# Patient Record
Sex: MALE | Race: WHITE | NOT HISPANIC OR LATINO | Employment: UNEMPLOYED | URBAN - METROPOLITAN AREA
[De-identification: names, ages, dates, MRNs, and addresses within clinical notes are randomized per-mention and may not be internally consistent; named-entity substitution may affect disease eponyms.]

---
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
## 2024-03-12 ENCOUNTER — OFFICE VISIT (OUTPATIENT)
Dept: PEDIATRICS | Facility: CLINIC | Age: 19
End: 2024-03-12
Payer: COMMERCIAL

## 2024-03-12 VITALS
TEMPERATURE: 98 F | HEIGHT: 76 IN | HEART RATE: 74 BPM | DIASTOLIC BLOOD PRESSURE: 72 MMHG | WEIGHT: 198.8 LBS | RESPIRATION RATE: 18 BRPM | SYSTOLIC BLOOD PRESSURE: 112 MMHG | BODY MASS INDEX: 24.21 KG/M2

## 2024-03-12 DIAGNOSIS — J30.9 ALLERGIC RHINOCONJUNCTIVITIS: ICD-10-CM

## 2024-03-12 DIAGNOSIS — H10.10 ALLERGIC RHINOCONJUNCTIVITIS: ICD-10-CM

## 2024-03-12 DIAGNOSIS — R04.0 RECURRENT EPISTAXIS: Primary | ICD-10-CM

## 2024-03-12 PROCEDURE — 3008F BODY MASS INDEX DOCD: CPT | Performed by: PEDIATRICS

## 2024-03-12 PROCEDURE — 1036F TOBACCO NON-USER: CPT | Performed by: PEDIATRICS

## 2024-03-12 PROCEDURE — 99214 OFFICE O/P EST MOD 30 MIN: CPT | Performed by: PEDIATRICS

## 2024-03-12 RX ORDER — FLUTICASONE PROPIONATE 50 MCG
2 SPRAY, SUSPENSION (ML) NASAL DAILY
Qty: 16 G | Refills: 11 | Status: SHIPPED | OUTPATIENT
Start: 2024-03-12 | End: 2024-06-10 | Stop reason: SDUPTHER

## 2024-03-12 NOTE — PROGRESS NOTES
"  Patient ID: Enoch Mcclelland is a 18 y.o. male who presents for Epistaxis (Nose Bleed) (Persistent nose bleeds of and on for 1 week ).  Today he is accompanied by accompanied by his MOTHER.     Concerned about nose bleeds that occur 6-8 times per month over the past 2 months  Admits to itchy/watery eyes, sneezing, clear nasal discharge, throat clearing, morning cough, and nasal congestion.  Denies purulent nasal drainage, fevers, vomiting, diarrhea, rash, otalgia.  Denies gingival bleeds, easy bruising, hematuria, hematochezia, melena, prolonged menses, or heavy menses.      Current Outpatient Medications:     escitalopram (Lexapro) 20 mg tablet, Take 1 tablet (20 mg) by mouth once daily., Disp: 90 tablet, Rfl: 1    fluticasone (Flonase) 50 mcg/actuation nasal spray, Administer 2 sprays into each nostril once daily. Shake gently. Before first use, prime pump. After use, clean tip and replace cap., Disp: 16 g, Rfl: 11    Past Medical History:   Diagnosis Date    Other conditions influencing health status 2020    Birth of     Other conditions influencing health status 2020    No prior hospitalizations       Past Surgical History:   Procedure Laterality Date    OTHER SURGICAL HISTORY  2020    Surgery       No family history on file.    Social History     Tobacco Use    Smoking status: Never     Passive exposure: Never    Smokeless tobacco: Never   Vaping Use    Vaping Use: Never used       Objective   /72   Pulse 74   Temp 36.7 °C (98 °F)   Resp 18   Ht 1.93 m (6' 4\")   Wt 90.2 kg (198 lb 12.8 oz)   BMI 24.20 kg/m²   BSA: 2.2 meters squared        BMI: Body mass index is 24.2 kg/m².   Growth percentiles: Height:  99 %ile (Z= 2.30) based on CDC (Boys, 2-20 Years) Stature-for-age data based on Stature recorded on 3/12/2024.   Weight:  92 %ile (Z= 1.41) based on CDC (Boys, 2-20 Years) weight-for-age data using vitals from 3/12/2024.  BMI:  67 %ile (Z= 0.44) based on CDC (Boys, 2-20 " Years) BMI-for-age based on BMI available as of 3/12/2024.    PHYSICAL EXAM  General  General Appearance - Not in acute distress, Not Irritable, Not Lethargic / Slow.  Mental Status - Alert.  Build & Nutrition - Well developed and Well nourished.  Hydration - Well hydrated.    Integumentary  - - warm and dry with no rashes, normal skin turgor and scalp and hair without rash, or lesion.    Head and Neck  - - normalocephalic, neck supple, thyroid normal size and consistancy and no lymphadenopathy.  Neck  Global Assessment - full range of motion, non-tender, No lymphadenopathy, no nucchal rigidty, no torticollis.  Trachea - midline.    Eye  - - Bilateral - sclera clear.    ENMT  - - Bilateral - TM pearly grey with good light reflex, external auditory canal pink and dry, nasopharynx with clear nasal drainage.  Nasal mucuosa pale and boggy.  oropharynx moist and pink, tonsils normal, uvula midline .  Ears  Pinna - Bilateral - no generalized tenderness observed. External Auditory Canal - Bilateral - no edema noted in EAC, no drainage observed.    Chest and Lung Exam  - - Bilateral - clear to auscultation, normal breathing effort and no chest deformity.  Inspection  Movements - Normal and Symmetrical. Accessory muscles - No use of accessory muscles in breathing.    Cardiovascular  - - regular rate and rhythm and no murmur, rub, or thrill.    Abdomen  - - soft, nontender, normal bowel sounds and no hepatomegaly, splenomegaly, or mass.  Inspection  Inspection of the abdomen reveals - No Abnormal pulsations, No Paradoxical movements and No Hernias. Skin - Inspection of the skin of the abdomen reveals - No Stria and No Ecchymoses.  Palpation/Percussion  Palpation and Percussion of the abdomen reveal - Soft, Non Tender, No Rebound tenderness, No Rigidity (guarding), No Abnormal dullness to percussion, No Abnormal tympany to percussion, No hepatosplenomegaly, No Palpable abdominal masses and No Subcutaneous  crepitus.  Auscultation  Auscultation of the abdomen reveals - Bowel sounds normal, No Abdominal bruits and No Venous hums.    Peripheral Vascular  - - Bilateral - peripheral pulses palpable in upper and lower extremity and no edema present.    Neurologic  Meningeal Signs - None.      Assessment/Plan   Problem List Items Addressed This Visit       Allergic rhinoconjunctivitis     Discussed allergies.  Instructed to return if fevers, otalgia, or purulent nasal discharge for more than 10 days.  Instructed to return if symptoms of respiratory distress of symptoms of dehydration.  Discussed role of allergy testing, referral to allergist, and treatment optons (antihistamines, leukotriene inhibitors, and nasal corticosteroids).           Relevant Medications    fluticasone (Flonase) 50 mcg/actuation nasal spray     Other Visit Diagnoses       Recurrent epistaxis    -  Primary          Most likely due to nasal congestion and irritation due to allergic rhinitis.    Discussed effect of cold / dry winter air.      Discussed nasal packing.  Return to clinic/ED if epistaxis for more than 20' despite packing.    Discussed the importance of maintain adequate control of allergic rhinitis and options for such.    Discussed the role of nasal moisturizers and encouraged as needed saline nasal sprays.      Discussed role of bleeding diathesis work-up and offered to investigate (guardian declines).    Discussed role of CT of sinuses to  assess for masses/polyps (guardian declines).      Jacques Licea MD

## 2024-03-12 NOTE — ASSESSMENT & PLAN NOTE
Discussed allergies.  Instructed to return if fevers, otalgia, or purulent nasal discharge for more than 10 days.  Instructed to return if symptoms of respiratory distress of symptoms of dehydration.  Discussed role of allergy testing, referral to allergist, and treatment optons (antihistamines, leukotriene inhibitors, and nasal corticosteroids).     Patient No Showed 4.3.19 appointment at 1:15 with Hari at [facility] location due to cab ride no show.

## 2024-06-03 ENCOUNTER — TELEPHONE (OUTPATIENT)
Dept: PEDIATRICS | Facility: CLINIC | Age: 19
End: 2024-06-03

## 2024-06-03 NOTE — TELEPHONE ENCOUNTER
Patient called and is wondering if he needs an appointment before getting a refill for his escitalopram.

## 2024-06-10 ENCOUNTER — OFFICE VISIT (OUTPATIENT)
Dept: PEDIATRICS | Facility: CLINIC | Age: 19
End: 2024-06-10

## 2024-06-10 VITALS
RESPIRATION RATE: 16 BRPM | TEMPERATURE: 97.6 F | HEART RATE: 80 BPM | SYSTOLIC BLOOD PRESSURE: 108 MMHG | WEIGHT: 200.6 LBS | BODY MASS INDEX: 24.43 KG/M2 | HEIGHT: 76 IN | DIASTOLIC BLOOD PRESSURE: 70 MMHG

## 2024-06-10 DIAGNOSIS — J30.9 ALLERGIC RHINOCONJUNCTIVITIS: ICD-10-CM

## 2024-06-10 DIAGNOSIS — F41.1 GENERALIZED ANXIETY DISORDER: ICD-10-CM

## 2024-06-10 DIAGNOSIS — Z00.00 WELL ADULT EXAM: Primary | ICD-10-CM

## 2024-06-10 DIAGNOSIS — F84.0 AUTISM (HHS-HCC): ICD-10-CM

## 2024-06-10 DIAGNOSIS — H54.7 VISUAL ACUITY REDUCED: ICD-10-CM

## 2024-06-10 DIAGNOSIS — R80.8 OTHER PROTEINURIA: ICD-10-CM

## 2024-06-10 DIAGNOSIS — Z13.31 DEPRESSION SCREEN: ICD-10-CM

## 2024-06-10 DIAGNOSIS — Z13.0 ENCOUNTER FOR SCREENING FOR HEMATOLOGIC DISORDER: ICD-10-CM

## 2024-06-10 DIAGNOSIS — H10.10 ALLERGIC RHINOCONJUNCTIVITIS: ICD-10-CM

## 2024-06-10 DIAGNOSIS — R63.5 ABNORMAL WEIGHT GAIN: ICD-10-CM

## 2024-06-10 DIAGNOSIS — E78.5 DYSLIPIDEMIA: ICD-10-CM

## 2024-06-10 LAB
POC APPEARANCE, URINE: CLEAR
POC BILIRUBIN, URINE: NEGATIVE
POC BLOOD, URINE: NEGATIVE
POC COLOR, URINE: ABNORMAL
POC GLUCOSE, URINE: NEGATIVE MG/DL
POC HEMOGLOBIN: 15.9 G/DL (ref 13.5–17.5)
POC KETONES, URINE: NEGATIVE MG/DL
POC LEUKOCYTES, URINE: NEGATIVE
POC NITRITE,URINE: NEGATIVE
POC PH, URINE: 6 PH
POC PROTEIN, URINE: ABNORMAL MG/DL
POC SPECIFIC GRAVITY, URINE: >=1.03
POC UROBILINOGEN, URINE: 0.2 EU/DL

## 2024-06-10 PROCEDURE — 3008F BODY MASS INDEX DOCD: CPT | Performed by: PEDIATRICS

## 2024-06-10 PROCEDURE — 1036F TOBACCO NON-USER: CPT | Performed by: PEDIATRICS

## 2024-06-10 PROCEDURE — 99213 OFFICE O/P EST LOW 20 MIN: CPT | Performed by: PEDIATRICS

## 2024-06-10 PROCEDURE — 99395 PREV VISIT EST AGE 18-39: CPT | Performed by: PEDIATRICS

## 2024-06-10 PROCEDURE — 81001 URINALYSIS AUTO W/SCOPE: CPT

## 2024-06-10 PROCEDURE — 84156 ASSAY OF PROTEIN URINE: CPT

## 2024-06-10 PROCEDURE — 81002 URINALYSIS NONAUTO W/O SCOPE: CPT | Performed by: PEDIATRICS

## 2024-06-10 PROCEDURE — 96127 BRIEF EMOTIONAL/BEHAV ASSMT: CPT | Performed by: PEDIATRICS

## 2024-06-10 PROCEDURE — 82570 ASSAY OF URINE CREATININE: CPT

## 2024-06-10 PROCEDURE — 85018 HEMOGLOBIN: CPT | Performed by: PEDIATRICS

## 2024-06-10 RX ORDER — FLUTICASONE PROPIONATE 50 MCG
2 SPRAY, SUSPENSION (ML) NASAL DAILY
Qty: 16 G | Refills: 11 | Status: SHIPPED | OUTPATIENT
Start: 2024-06-10 | End: 2025-06-10

## 2024-06-10 NOTE — ASSESSMENT & PLAN NOTE
f/u by:    12-  Date of Last CBC/D, CMP, TSH:   2-  Current control is:    Adequate  Discontinue prescription if thoughts of harming oneself, harming others, suicide, or homicide.  Encouraged to follow with psychology.

## 2024-06-10 NOTE — PROGRESS NOTES
Patient ID: Enoch Mcclelland is a 18 y.o. male who presents for Well Adult Visit.  Today he is un-accompanied.    Offer was made to have a nurse chaperone present during examination; patient declines offer.    Also here to follow-up on Generalized Anxiety Disorder.    Since seen last, there have been no new concerns.    Anxiety is currently well controlled.    Denies suicidal thoughts or ideation.    Denies homicidal thoughts or ideation.  Date of Last CBC/D, CMP, TSH was:  12-      The patient denies all TB risk factors (Specifically, patie nt denies that there has not been exposure to any of the following:  a homeless individual; a previously incarcerated individual; an immigrant from [place], [place], [place], [place], or [place]; an individual who is institutionalized; an individual who lives in a nursing home; an individual known to be infected with HIV; an individual known to be infected with TB.  The patientdenies having traveled to [place], [place], [place], [place], or [place].)    Mental Health:  A screening questionnaire for depression was negative.      Tobacco:  Denies use  Alcohol:  Denies use  Drugs:  Denies use  Sexual activity:  Denies current or past sexual activity  Safety concerns:  Denies being the victim of harassment, bullying, gang involvement.  Denies current or past history of abuse (physical, sexual, verbal, or emotional)    Diet:  The patient was advised to consume 3 servings of green vegetables per day (if not, adherence with a MVI was stressed).  The patient was advised to consume a minimum of 2 servings of meat per week (if not, adherence with a MVI was stressed).  The patient was advised to assure 1000 mg of Calcium and 1000 IU's of Vitamin D per day.  Discussion of supplementing with Caltrate-D was advised is dairy product consumption is not sufficient.  All concerns and questions regarding diet, nutrition, and eating habits were addressed.  "    Elimination:  The patient denies concerns regarding chronic constipation or diarrhea.  Voiding:  The patient denies concerns regarding urination or urinary symptoms.  Sleep:  The patient denies concerns regarding sleep; specifically there are no issues regarding the patients ability to fall asleep, stay asleep, or sleep throughout the night.    HOME LIFE:  There are no concerns with regards to the patient's relationships at home.    School:   Graduated from High School  Attends [employer].    In Grade:   Finished Freshman yejuana  Achieves:   3.8 GPA  Major:  undecided, perhaps physics and music  Sports:   none  Activities:   trombone, marching band, symphony    SOCIAL DETERMINANTS OF HEALTH:    Within the past 12 months, have you worried that your food would run out before you got money to buy more?  No  Within the past 12 months, the food you bought just did not last and you did not have money to get more?  No        Current Outpatient Medications:     escitalopram (Lexapro) 20 mg tablet, Take 1 tablet (20 mg) by mouth once daily., Disp: 90 tablet, Rfl: 1    fluticasone (Flonase) 50 mcg/actuation nasal spray, Administer 2 sprays into each nostril once daily. Shake gently. Before first use, prime pump. After use, clean tip and replace cap., Disp: 16 g, Rfl: 11    Past Medical History:   Diagnosis Date    Other conditions influencing health status 2020    Birth of     Other conditions influencing health status 2020    No prior hospitalizations       Past Surgical History:   Procedure Laterality Date    OTHER SURGICAL HISTORY  2020    Surgery       No family history on file.    Social History     Tobacco Use    Smoking status: Never     Passive exposure: Never    Smokeless tobacco: Never   Vaping Use    Vaping status: Never Used       Objective   /70   Pulse 80   Temp 36.4 °C (97.6 °F)   Resp 16   Ht 1.932 m (6' 4.06\")   Wt 91 kg (200 lb 9.6 oz)   BMI 24.38 kg/m² "   BSA: 2.21 meters squared        BMI: Body mass index is 24.38 kg/m².   Growth percentiles: Height:  99 %ile (Z= 2.32) based on [facility] (Boys, 2-20 Years) Stature-for-age data based on Stature recorded on 6/10/2024.   Weight:  92 %ile (Z= 1.43) based on [facility] (Boys, 2-20 Years) weight-for-age data using vitals from 6/10/2024.  BMI:  67 %ile (Z= 0.45) based on [facility] (Boys, 2-20 Years) BMI-for-age based on BMI available as of 6/10/2024.    PHYSICAL EXAM  General  General Appearance - Not in acute distress, Not Irritable, Not Lethargic / Slow.  Mental Status - Alert.  Build & Nutrition - Well developed and Well nourished.  Hydration - Well hydrated.    Integumentary  - - warm and dry with no rashes, normal skin turgor and scalp and hair without rash, or lesion.    Head and Neck  - - normalocephalic, neck supple, thyroid normal size and consistancy and no lymphadenopathy.  Head    Fontanelles and Sutures: Anterior Cuervo - Characteristics - closed. Posterior Cuervo - Characteristics - closed.  Neck  Global Assessment - full range of motion, non-tender, No lymphadenopathy, no nucchal rigidty, no torticollis.  Trachea - midline.    Eye  - - Bilateral - pupils equal and round (No strabismus), sclera clear and lids pink without edema or mass.  Fundi - Bilateral - Normal.    ENMT  - - Bilateral - TM pearly grey with good light reflex, external auditory canal pink and dry, nasopharynx moist and pink and oropharynx moist and pink, tonsils normal, uvula midline .  Ears  Pinna - Bilateral - no generalized tenderness observed. External Auditory Canal - Bilateral - no edema noted in EAC, no drainage observed.  Mouth and Throat  Oral Cavity/Oropharynx - Hard Palate - no asymmetry observed, no erythema noted. Soft Palate - no asymmetry noted, no erythema noted. Oral Mucosa - moist.    Chest and Lung Exam  - - Bilateral - clear to auscultation, normal breathing effort and no chest deformity.  Inspection  Movements - Normal and  Symmetrical. Accessory muscles - No use of accessory muscles in breathing.    Breast  - - Bilateral - symmetry, no mass palpable, no skin change and no nipple discharge.    Cardiovascular  - - regular rate and rhythm and no murmur, rub, or thrill.    Abdomen  - - soft, nontender, normal bowel sounds and no hepatomegaly, splenomegaly, or mass.  Inspection  Inspection of the abdomen reveals - No Abnormal pulsations, No Paradoxical movements and No Hernias. Skin - Inspection of the skin of the abdomen reveals - No Stria and No Ecchymoses.  Palpation/Percussion  Palpation and Percussion of the abdomen reveal - Soft, Non Tender, No Rebound tenderness, No Rigidity (guarding), No Abnormal dullness to percussion, No Abnormal tympany to percussion, No hepatosplenomegaly, No Palpable abdominal masses and No Subcutaneous crepitus.  Auscultation  Auscultation of the abdomen reveals - Bowel sounds normal, No Abdominal bruits and No Venous hums.    Male Genitourinary  - - Bilateral - normal circumcised phallus, testicle smooth, round, and normal size and no palpable inguinal hernia.  Evaluation of genitourinary system reveals - scrotum non-tender, no masses, normal testes, no palpable masses, urethral meatus normal, no discharge, normal penis and normal anus and perineum, no lesions.  Sexual Maturity  Will 5 - Adult hair pattern, Adult penile size and shape and Adult testicular size and shape.    Peripheral Vascular  - - Bilateral - peripheral pulses palpable in upper and lower extremity and no edema present.  Upper Extremity  Inspection - Bilateral - No Cyanotic nailbeds, No Delayed capillary refill, no Digital clubbing, No Erythema, Not Pale, No Petechiae. Palpation - Temperature - Bilateral - Normal.  Lower Extremity  Inspection - Bilateral - No Cyanotic nailbeds, No Delayed capillary refill, No Erythema, Not Pale. Palpation - Temperature - Bilateral - Normal.    Neurologic  - - normal sensation, cranial nerves II-XII intact  and deep tendon reflexes normal.  Neurologic evaluation reveals  - normal sensation, normal coordination and upper and lower extremity deep tendon reflexes intact bilaterally .  Mental Status  Affect - normal. Speech - Normal. Thought content/perception - Normal. Cognitive function - Normal.  Cranial Nerves  III Oculomotor - Pupillary constriction - Bilateral - Normal. Eye Movements - Nystagmus - Bilateral - None.  Overall Assessment of Muscle Strength and Tone reveals  Upper Extremities - Right Deltoid - 5/5. Left Deltoid - 5/5. Right Bicep - 5/5. Left Bicep - 5/5. Right Tricep - 5/5. Left Tricep - 5/5. Right Intrinsics - 5/5. Left Intrinsics - 5/5. Lower Extremities - Right Iliopsoas - 5/5. Left Iliopsoas - 5/5. Right Quadriceps - 5/5. Left Quadriceps - 5/5. Right Hamstrings - 5/5. Left Hamstrings - 5/5. Right Tibialis Anterior - 5/5. Left Tibialis Anterior - 5/5. Right Gastroc-Soleus - 5/5. Left Gastroc-Soleus - 5/5.  Meningeal Signs - None.    Musculoskeletal  - - normal posture, normal gait and station, Head and neck are symmetric, no deformities, masses or tenderness, Head and neck show normal ROM without pain or weakness, Spine shows normal curvatures full ROM without pain or weakness, Upper extremities show normal ROM without pain or weakness, Lower extremities show full ROM without pain or weakness and Patient is able to heel walk, toe walk, and duck walk.  Lower Extremity  Hip - Examination of the right hip reveals - no instability, subluxation or laxity. Examination of the left hip reveals - no instability, subluxation or laxity.    Lymphatic  - - Bilateral - no lymphadenopathy.        Assessment/Plan   Problem List Items Addressed This Visit       Allergic rhinoconjunctivitis    Relevant Medications    fluticasone (Flonase) 50 mcg/actuation nasal spray    Autism ([facility])    Body mass index (BMI) of 24.0 to 24.9 in adult    Dyslipidemia    Generalized anxiety disorder     f/u by:    12-  Date of  Last CBC/D, CMP, TSH:   2-  Current control is:    Adequate  Discontinue prescription if thoughts of harming oneself, harming others, suicide, or homicide.  Encouraged to follow with psychology.         Relevant Orders    Comprehensive metabolic panel    Visual acuity reduced    Well adult exam - Primary    Relevant Orders    POCT UA (nonautomated) manually resulted (Completed)    Hearing screen (Completed)     Other Visit Diagnoses       Depression screen        Relevant Orders    Staff Communication: PHQ-9, ASQ (Completed)    Encounter for screening for hematologic disorder        Relevant Orders    POCT hemoglobin manually resulted (Completed)    CBC and Auto Differential    Abnormal weight gain        Relevant Orders    TSH with reflex to Free T4 if abnormal    Other proteinuria        Relevant Orders    Urinalysis with Reflex Microscopic            Report:   Distortion product evoked otoacoustic emissions limited evaluation (to confirm the presence or absence of hearing disorder, at 2, 3, 4 and 5 kHz for each ear) were obtained.    interpretation:   Normal hearing      ANTICPIATORY GUIDANCE:  The following topics were discussed:   use of alcohol, tobacco, and drugs with discussion of health risks; acedemics with discussion of acedemic performance, extra-curricular activities, career planning; dental care and encouragment of biannual dental cleanings; fire safety; firearm safety; water safety; bullying and harassement; safe home and school environments; history of past or current abuse; helmet safety for all at risk recreational and competetive activities; sex including use of condoms, STD testing.  car safety and use of seatbelts.  Discussed importance of maintaining physical activity.      Jacques Licea MD

## 2024-06-11 DIAGNOSIS — R80.8 OTHER PROTEINURIA: Primary | ICD-10-CM

## 2024-06-11 LAB
APPEARANCE UR: ABNORMAL
BILIRUB UR STRIP.AUTO-MCNC: NEGATIVE MG/DL
COLOR UR: ABNORMAL
CREAT UR-MCNC: 373 MG/DL (ref 20–370)
CREAT UR-MCNC: 373 MG/DL (ref 20–370)
GLUCOSE UR STRIP.AUTO-MCNC: NORMAL MG/DL
KETONES UR STRIP.AUTO-MCNC: NEGATIVE MG/DL
LEUKOCYTE ESTERASE UR QL STRIP.AUTO: NEGATIVE
MUCOUS THREADS #/AREA URNS AUTO: NORMAL /LPF
NITRITE UR QL STRIP.AUTO: NEGATIVE
PH UR STRIP.AUTO: 5.5 [PH]
PROT UR STRIP.AUTO-MCNC: ABNORMAL MG/DL
PROT UR-ACNC: 16 MG/DL (ref 5–25)
PROT/CREAT UR: 0.04 MG/MG CREAT (ref 0–0.17)
RBC # UR STRIP.AUTO: NEGATIVE /UL
RBC #/AREA URNS AUTO: NORMAL /HPF
SP GR UR STRIP.AUTO: 1.03
UROBILINOGEN UR STRIP.AUTO-MCNC: NORMAL MG/DL
WBC #/AREA URNS AUTO: NORMAL /HPF

## 2024-06-12 ENCOUNTER — TELEPHONE (OUTPATIENT)
Dept: PEDIATRICS | Facility: CLINIC | Age: 19
End: 2024-06-12

## 2024-06-12 NOTE — TELEPHONE ENCOUNTER
----- Message from Jacques Licea MD sent at 6/12/2024  7:55 AM EDT -----  Let patient know that his urine had a little protein in it; however, when quantified, the amount would be considered acceptable.

## 2024-06-14 ENCOUNTER — LAB (OUTPATIENT)
Dept: LAB | Facility: LAB | Age: 19
End: 2024-06-14

## 2024-06-14 DIAGNOSIS — F41.1 GENERALIZED ANXIETY DISORDER: ICD-10-CM

## 2024-06-14 DIAGNOSIS — Z91.89 AT RISK FOR SIDE EFFECT OF MEDICATION: ICD-10-CM

## 2024-06-14 DIAGNOSIS — Z13.0 ENCOUNTER FOR SCREENING FOR HEMATOLOGIC DISORDER: ICD-10-CM

## 2024-06-14 DIAGNOSIS — R63.5 ABNORMAL WEIGHT GAIN: ICD-10-CM

## 2024-06-14 LAB
ALBUMIN SERPL BCP-MCNC: 4.7 G/DL (ref 3.4–5)
ALP SERPL-CCNC: 88 U/L (ref 33–120)
ALT SERPL W P-5'-P-CCNC: 30 U/L (ref 10–52)
ANION GAP SERPL CALC-SCNC: 11 MMOL/L (ref 10–20)
AST SERPL W P-5'-P-CCNC: 19 U/L (ref 9–39)
BASOPHILS # BLD AUTO: 0.04 X10*3/UL (ref 0–0.1)
BASOPHILS NFR BLD AUTO: 0.6 %
BILIRUB SERPL-MCNC: 0.8 MG/DL (ref 0–1.2)
BUN SERPL-MCNC: 14 MG/DL (ref 6–23)
CALCIUM SERPL-MCNC: 10 MG/DL (ref 8.6–10.3)
CHLORIDE SERPL-SCNC: 105 MMOL/L (ref 98–107)
CO2 SERPL-SCNC: 29 MMOL/L (ref 21–32)
CREAT SERPL-MCNC: 1.12 MG/DL (ref 0.5–1.3)
EGFRCR SERPLBLD CKD-EPI 2021: >90 ML/MIN/1.73M*2
EOSINOPHIL # BLD AUTO: 0.03 X10*3/UL (ref 0–0.7)
EOSINOPHIL NFR BLD AUTO: 0.5 %
ERYTHROCYTE [DISTWIDTH] IN BLOOD BY AUTOMATED COUNT: 11.8 % (ref 11.5–14.5)
GLUCOSE SERPL-MCNC: 101 MG/DL (ref 74–99)
HCT VFR BLD AUTO: 46.5 % (ref 41–52)
HGB BLD-MCNC: 16.4 G/DL (ref 13.5–17.5)
IMM GRANULOCYTES # BLD AUTO: 0.01 X10*3/UL (ref 0–0.7)
IMM GRANULOCYTES NFR BLD AUTO: 0.2 % (ref 0–0.9)
LYMPHOCYTES # BLD AUTO: 2.34 X10*3/UL (ref 1.2–4.8)
LYMPHOCYTES NFR BLD AUTO: 37.9 %
MCH RBC QN AUTO: 30.6 PG (ref 26–34)
MCHC RBC AUTO-ENTMCNC: 35.3 G/DL (ref 32–36)
MCV RBC AUTO: 87 FL (ref 80–100)
MONOCYTES # BLD AUTO: 0.54 X10*3/UL (ref 0.1–1)
MONOCYTES NFR BLD AUTO: 8.7 %
NEUTROPHILS # BLD AUTO: 3.22 X10*3/UL (ref 1.2–7.7)
NEUTROPHILS NFR BLD AUTO: 52.1 %
NRBC BLD-RTO: 0 /100 WBCS (ref 0–0)
PLATELET # BLD AUTO: 174 X10*3/UL (ref 150–450)
POTASSIUM SERPL-SCNC: 4.1 MMOL/L (ref 3.5–5.3)
PROT SERPL-MCNC: 7 G/DL (ref 6.4–8.2)
RBC # BLD AUTO: 5.36 X10*6/UL (ref 4.5–5.9)
SODIUM SERPL-SCNC: 141 MMOL/L (ref 136–145)
TSH SERPL-ACNC: 1.24 MIU/L (ref 0.44–3.98)
WBC # BLD AUTO: 6.2 X10*3/UL (ref 4.4–11.3)

## 2024-06-14 PROCEDURE — 80053 COMPREHEN METABOLIC PANEL: CPT

## 2024-06-14 PROCEDURE — 84443 ASSAY THYROID STIM HORMONE: CPT

## 2024-06-14 PROCEDURE — 85025 COMPLETE CBC W/AUTO DIFF WBC: CPT

## 2024-06-14 PROCEDURE — 36415 COLL VENOUS BLD VENIPUNCTURE: CPT

## 2024-06-17 ENCOUNTER — TELEPHONE (OUTPATIENT)
Dept: PEDIATRICS | Facility: CLINIC | Age: 19
End: 2024-06-17

## 2024-06-17 NOTE — TELEPHONE ENCOUNTER
----- Message from Jacques Licea MD sent at 6/17/2024  8:26 AM EDT -----  Let PATIENT know blood counts, kidney function, liver function, thyroid function were all normal

## 2024-09-17 DIAGNOSIS — F41.1 GENERALIZED ANXIETY DISORDER: ICD-10-CM

## 2024-09-18 RX ORDER — ESCITALOPRAM OXALATE 20 MG/1
20 TABLET ORAL DAILY
Qty: 90 TABLET | Refills: 0 | Status: SHIPPED | OUTPATIENT
Start: 2024-09-18

## 2024-09-20 DIAGNOSIS — F41.1 GENERALIZED ANXIETY DISORDER: ICD-10-CM

## 2024-09-23 RX ORDER — ESCITALOPRAM OXALATE 20 MG/1
TABLET ORAL
Qty: 90 TABLET | Refills: 0 | Status: SHIPPED | OUTPATIENT
Start: 2024-09-23

## 2024-12-18 ENCOUNTER — TELEPHONE (OUTPATIENT)
Dept: PEDIATRICS | Facility: CLINIC | Age: 19
End: 2024-12-18
Payer: COMMERCIAL

## 2024-12-19 DIAGNOSIS — F41.1 GENERALIZED ANXIETY DISORDER: ICD-10-CM

## 2024-12-19 RX ORDER — ESCITALOPRAM OXALATE 20 MG/1
20 TABLET ORAL DAILY
Qty: 30 TABLET | Refills: 0 | Status: SHIPPED | OUTPATIENT
Start: 2024-12-19

## 2024-12-19 NOTE — TELEPHONE ENCOUNTER
Spoke with patient, he asked for his lexapro to be refilled one week early if possible. Patient stated mom talked to pharmacy but they could not refill medication.  Told patient I will send message over to dr sanchez, but could not promise an early refill can be done.

## 2024-12-19 NOTE — TELEPHONE ENCOUNTER
Spoke with patient, he is aware.    Viviana can you call patient and help schedule an medication follow up, dr sanchez stated it can be virtually or in person.

## 2025-01-02 ENCOUNTER — APPOINTMENT (OUTPATIENT)
Dept: PEDIATRICS | Facility: CLINIC | Age: 20
End: 2025-01-02
Payer: COMMERCIAL

## 2025-01-02 VITALS
BODY MASS INDEX: 24.52 KG/M2 | RESPIRATION RATE: 18 BRPM | WEIGHT: 201.8 LBS | SYSTOLIC BLOOD PRESSURE: 122 MMHG | OXYGEN SATURATION: 96 % | DIASTOLIC BLOOD PRESSURE: 70 MMHG | TEMPERATURE: 97.8 F | HEART RATE: 104 BPM

## 2025-01-02 DIAGNOSIS — F41.1 GENERALIZED ANXIETY DISORDER: Primary | ICD-10-CM

## 2025-01-02 DIAGNOSIS — Z91.89 AT RISK FOR SIDE EFFECT OF MEDICATION: ICD-10-CM

## 2025-01-02 PROCEDURE — 99213 OFFICE O/P EST LOW 20 MIN: CPT | Performed by: PEDIATRICS

## 2025-01-02 PROCEDURE — 1036F TOBACCO NON-USER: CPT | Performed by: PEDIATRICS

## 2025-01-02 RX ORDER — ESCITALOPRAM OXALATE 20 MG/1
20 TABLET ORAL DAILY
Qty: 90 TABLET | Refills: 1 | Status: SHIPPED | OUTPATIENT
Start: 2025-01-02

## 2025-01-02 NOTE — PROGRESS NOTES
Patient ID: Enoch Mcclelland is a 19 y.o. male who presents for follow-up on generalized anxiety disorder.    Today he is un-accompanied     Here to follow-up on Generalized Anxiety Disorder.    Since seen last, there have been no new concerns.    Anxiety is currently well controlled.    Denies suicidal thoughts or ideation.    Denies homicidal thoughts or ideation.  Date of Last CBC/D, CMP, TSH was:   2024    Denies recent fevers, nasal drainage, congestion, cough, vomiting, diarrhea, otalgia.      Current Outpatient Medications:     escitalopram (Lexapro) 20 mg tablet, Take 1 tablet (20 mg) by mouth once daily., Disp: 90 tablet, Rfl: 1    fluticasone (Flonase) 50 mcg/actuation nasal spray, Administer 2 sprays into each nostril once daily. Shake gently. Before first use, prime pump. After use, clean tip and replace cap., Disp: 16 g, Rfl: 11    Past Medical History:   Diagnosis Date    Other conditions influencing health status 2020    Birth of     Other conditions influencing health status 2020    No prior hospitalizations       Past Surgical History:   Procedure Laterality Date    OTHER SURGICAL HISTORY  2020    Surgery       No family history on file.    Social History     Tobacco Use    Smoking status: Never     Passive exposure: Never    Smokeless tobacco: Never   Vaping Use    Vaping status: Never Used       Objective   /70   Pulse 104   Temp 36.6 °C (97.8 °F) (Temporal)   Resp 18   Wt 91.5 kg (201 lb 12.8 oz)   SpO2 96%   BMI 24.52 kg/m²   BSA: 2.22 meters squared        BMI: Body mass index is 24.52 kg/m².   Growth percentiles: Height:  Facility age limit for growth %ольга is 20 years.   Weight:  Facility age limit for growth %ольга is 20 years.  BMI:  Facility age limit for growth %ольга is 20 years.    PHYSICAL EXAM  General   -- Appearance - Not in acute distress, Not Irritable, Not Lethargic / Slow.    -- Build & Nutrition - Well developed and Well nourished.    --  Hydration - Well hydrated.    Integumentary    -- No visible rashes.      Head  - - normalocephalic    Ophthamologic:    --  eye are nonicteric    Neck  --  range of motion is full    Infectious Disease  -- No Meningeal Signs    Vascular  --  Skin well profused    Respiratory  -- No respiratory Distress.    Neurologic  --  CN II-XII grossly intact.      Psychiatric  --  mental status is undisturbed      Assessment/Plan   Problem List Items Addressed This Visit       Generalized anxiety disorder - Primary     f/u by:    6-  Date of Last CBC/D, CMP, TSH:   6-  Current control is:    Adequate  Discontinue prescription if thoughts of harming oneself, harming others, suicide, or homicide.  Encouraged to follow with psychology.         Relevant Medications    escitalopram (Lexapro) 20 mg tablet    Other Relevant Orders    Comprehensive metabolic panel     Other Visit Diagnoses       At risk for side effect of medication        Relevant Orders    CBC and Auto Differential    TSH with reflex to Free T4 if abnormal            Jacques Licea MD

## 2025-01-02 NOTE — ASSESSMENT & PLAN NOTE
f/u by:    6-  Date of Last CBC/D, CMP, TSH:   6-  Current control is:    Adequate  Discontinue prescription if thoughts of harming oneself, harming others, suicide, or homicide.  Encouraged to follow with psychology.

## 2025-01-03 ENCOUNTER — LAB (OUTPATIENT)
Dept: LAB | Facility: LAB | Age: 20
End: 2025-01-03
Payer: COMMERCIAL

## 2025-01-03 DIAGNOSIS — Z91.89 AT RISK FOR SIDE EFFECT OF MEDICATION: ICD-10-CM

## 2025-01-03 DIAGNOSIS — F41.1 GENERALIZED ANXIETY DISORDER: ICD-10-CM

## 2025-01-03 PROCEDURE — 80053 COMPREHEN METABOLIC PANEL: CPT

## 2025-01-03 PROCEDURE — 84443 ASSAY THYROID STIM HORMONE: CPT

## 2025-01-03 PROCEDURE — 85025 COMPLETE CBC W/AUTO DIFF WBC: CPT

## 2025-01-04 LAB
ALBUMIN SERPL BCP-MCNC: 4.7 G/DL (ref 3.4–5)
ALP SERPL-CCNC: 77 U/L (ref 33–120)
ALT SERPL W P-5'-P-CCNC: 25 U/L (ref 10–52)
ANION GAP SERPL CALC-SCNC: 10 MMOL/L (ref 10–20)
AST SERPL W P-5'-P-CCNC: 16 U/L (ref 9–39)
BASOPHILS # BLD AUTO: 0.03 X10*3/UL (ref 0–0.1)
BASOPHILS NFR BLD AUTO: 0.6 %
BILIRUB SERPL-MCNC: 0.6 MG/DL (ref 0–1.2)
BUN SERPL-MCNC: 16 MG/DL (ref 6–23)
CALCIUM SERPL-MCNC: 9.7 MG/DL (ref 8.6–10.3)
CHLORIDE SERPL-SCNC: 107 MMOL/L (ref 98–107)
CO2 SERPL-SCNC: 28 MMOL/L (ref 21–32)
CREAT SERPL-MCNC: 0.96 MG/DL (ref 0.5–1.3)
EGFRCR SERPLBLD CKD-EPI 2021: >90 ML/MIN/1.73M*2
EOSINOPHIL # BLD AUTO: 0.03 X10*3/UL (ref 0–0.7)
EOSINOPHIL NFR BLD AUTO: 0.6 %
ERYTHROCYTE [DISTWIDTH] IN BLOOD BY AUTOMATED COUNT: 12 % (ref 11.5–14.5)
GLUCOSE SERPL-MCNC: 102 MG/DL (ref 74–99)
HCT VFR BLD AUTO: 45.4 % (ref 41–52)
HGB BLD-MCNC: 16.2 G/DL (ref 13.5–17.5)
IMM GRANULOCYTES # BLD AUTO: 0.01 X10*3/UL (ref 0–0.7)
IMM GRANULOCYTES NFR BLD AUTO: 0.2 % (ref 0–0.9)
LYMPHOCYTES # BLD AUTO: 2.13 X10*3/UL (ref 1.2–4.8)
LYMPHOCYTES NFR BLD AUTO: 39.4 %
MCH RBC QN AUTO: 30.1 PG (ref 26–34)
MCHC RBC AUTO-ENTMCNC: 35.7 G/DL (ref 32–36)
MCV RBC AUTO: 84 FL (ref 80–100)
MONOCYTES # BLD AUTO: 0.36 X10*3/UL (ref 0.1–1)
MONOCYTES NFR BLD AUTO: 6.7 %
NEUTROPHILS # BLD AUTO: 2.85 X10*3/UL (ref 1.2–7.7)
NEUTROPHILS NFR BLD AUTO: 52.5 %
NRBC BLD-RTO: 0 /100 WBCS (ref 0–0)
PLATELET # BLD AUTO: 182 X10*3/UL (ref 150–450)
POTASSIUM SERPL-SCNC: 3.8 MMOL/L (ref 3.5–5.3)
PROT SERPL-MCNC: 7.1 G/DL (ref 6.4–8.2)
RBC # BLD AUTO: 5.38 X10*6/UL (ref 4.5–5.9)
SODIUM SERPL-SCNC: 141 MMOL/L (ref 136–145)
TSH SERPL-ACNC: 0.7 MIU/L (ref 0.44–3.98)
WBC # BLD AUTO: 5.4 X10*3/UL (ref 4.4–11.3)

## 2025-01-06 ENCOUNTER — TELEPHONE (OUTPATIENT)
Dept: PEDIATRICS | Facility: CLINIC | Age: 20
End: 2025-01-06
Payer: COMMERCIAL

## 2025-01-06 NOTE — TELEPHONE ENCOUNTER
----- Message from Jacques Licea sent at 1/6/2025  8:31 AM EST -----  Let PATIENT know blood counts, kidney function, liver function, thyroid function were all normal

## 2025-05-27 ENCOUNTER — TELEPHONE (OUTPATIENT)
Dept: PEDIATRICS | Facility: CLINIC | Age: 20
End: 2025-05-27
Payer: COMMERCIAL

## 2025-05-28 NOTE — TELEPHONE ENCOUNTER
Spoke with patient, he stated he would like a therapist referral-specifically he stated he has on the autism spectrum, it recently has been getting in the way at work and school. He has states of depression/no motivation. Concerns about pay for student loans and bills. Having difficulty finding a job.   Along with other concerns.  Unsure if this is something that needs to be discussed in person.

## 2025-06-11 ENCOUNTER — APPOINTMENT (OUTPATIENT)
Dept: PEDIATRICS | Facility: CLINIC | Age: 20
End: 2025-06-11

## 2025-06-11 VITALS
WEIGHT: 201.8 LBS | HEART RATE: 110 BPM | OXYGEN SATURATION: 98 % | TEMPERATURE: 98 F | RESPIRATION RATE: 21 BRPM | BODY MASS INDEX: 24.57 KG/M2 | SYSTOLIC BLOOD PRESSURE: 120 MMHG | HEIGHT: 76 IN | DIASTOLIC BLOOD PRESSURE: 72 MMHG

## 2025-06-11 DIAGNOSIS — Z91.89 AT RISK FOR SIDE EFFECT OF MEDICATION: ICD-10-CM

## 2025-06-11 DIAGNOSIS — Z13.31 DEPRESSION SCREEN: ICD-10-CM

## 2025-06-11 DIAGNOSIS — Z13.0 ENCOUNTER FOR SCREENING FOR HEMATOLOGIC DISORDER: ICD-10-CM

## 2025-06-11 DIAGNOSIS — Z00.00 WELL ADULT EXAM: Primary | ICD-10-CM

## 2025-06-11 DIAGNOSIS — Z13.89 SCREENING FOR BLOOD OR PROTEIN IN URINE: ICD-10-CM

## 2025-06-11 DIAGNOSIS — H54.7 VISUAL ACUITY REDUCED: ICD-10-CM

## 2025-06-11 DIAGNOSIS — F41.1 GENERALIZED ANXIETY DISORDER: ICD-10-CM

## 2025-06-11 DIAGNOSIS — Z01.10 HEARING SCREEN WITHOUT ABNORMAL FINDINGS: ICD-10-CM

## 2025-06-11 DIAGNOSIS — F84.0 AUTISM (HHS-HCC): ICD-10-CM

## 2025-06-11 LAB
POC APPEARANCE, URINE: CLEAR
POC BILIRUBIN, URINE: NEGATIVE
POC BLOOD, URINE: NEGATIVE
POC COLOR, URINE: YELLOW
POC GLUCOSE, URINE: NEGATIVE MG/DL
POC HEMOGLOBIN: 16.5 G/DL (ref 13.5–17.5)
POC KETONES, URINE: NEGATIVE MG/DL
POC LEUKOCYTES, URINE: NEGATIVE
POC NITRITE,URINE: NEGATIVE
POC PH, URINE: 5.5 PH
POC PROTEIN, URINE: NEGATIVE MG/DL
POC SPECIFIC GRAVITY, URINE: 1.02
POC UROBILINOGEN, URINE: 0.2 EU/DL

## 2025-06-11 PROCEDURE — 99213 OFFICE O/P EST LOW 20 MIN: CPT | Performed by: PEDIATRICS

## 2025-06-11 PROCEDURE — 96127 BRIEF EMOTIONAL/BEHAV ASSMT: CPT | Performed by: PEDIATRICS

## 2025-06-11 PROCEDURE — 81002 URINALYSIS NONAUTO W/O SCOPE: CPT | Performed by: PEDIATRICS

## 2025-06-11 PROCEDURE — 3008F BODY MASS INDEX DOCD: CPT | Performed by: PEDIATRICS

## 2025-06-11 PROCEDURE — 85018 HEMOGLOBIN: CPT | Performed by: PEDIATRICS

## 2025-06-11 PROCEDURE — 99395 PREV VISIT EST AGE 18-39: CPT | Performed by: PEDIATRICS

## 2025-06-11 PROCEDURE — 1036F TOBACCO NON-USER: CPT | Performed by: PEDIATRICS

## 2025-06-11 RX ORDER — ESCITALOPRAM OXALATE 20 MG/1
20 TABLET ORAL DAILY
Qty: 90 TABLET | Refills: 1 | Status: SHIPPED | OUTPATIENT
Start: 2025-06-11

## 2025-06-11 NOTE — ASSESSMENT & PLAN NOTE
f/u by:    12-  Date of Last CBC/D, CMP, TSH:   1-3-2025  Current control is:    Adequate  Discontinue prescription if thoughts of harming oneself, harming others, suicide, or homicide.  Encouraged to follow with psychology.

## 2025-06-11 NOTE — PROGRESS NOTES
Patient ID: Enoch Mcclelland is a 19 y.o. male who presents for No chief complaint on file..  Today he is un-accompanied.    Offer was made to have a nurse chaperone present during examination; patient declines offer.    Also here to follow-up on Generalized Anxiety Disorder.    Since seen last, there have been no new concerns.    Anxiety is currently well controlled.    Denies suicidal thoughts or ideation.    Denies homicidal thoughts or ideation.  Date of Last CBC/D, CMP, TSH was:  1-3-2025    The patient denies all TB risk factors (Specifically, patie nt denies that there has not been exposure to any of the following:  a homeless individual; a previously incarcerated individual; an immigrant from [place], [place], [place] or [place]; an individual who is institutionalized; an individual who lives in a nursing home; an individual known to be infected with HIV; an individual known to be infected with TB.  The patientdenies having traveled to [place], [place], [place], [place], or [place].)    Mental Health:  A screening questionnaire for depression was negative.      Tobacco:  Denies use  Alcohol:  Denies use  Drugs:  Denies use  Sexual activity:  Denies current or past sexual activity  Safety concerns:  Denies being the victim of harassment, bullying, gang involvement.  Denies current or past history of abuse (physical, sexual, verbal, or emotional)    Diet:  The patient was advised to consume 3 servings of green vegetables per day (if not, adherence with a MVI was stressed).  The patient was advised to consume a minimum of 2 servings of meat per week (if not, adherence with a MVI was stressed).  The patient was advised to assure 1000 mg of Calcium and 1000 IU's of Vitamin D per day.  Discussion of supplementing with Caltrate-D was advised is dairy product consumption is not sufficient.  All concerns and questions regarding diet, nutrition, and eating habits were  addressed.     Elimination:  The patient denies concerns regarding chronic constipation or diarrhea.  Voiding:  The patient denies concerns regarding urination or urinary symptoms.  Sleep:  The patient denies concerns regarding sleep; specifically there are no issues regarding the patients ability to fall asleep, stay asleep, or sleep throughout the night.    HOME LIFE:  There are no concerns with regards to the patient's relationships at home.    School:   Graduated from high school, attends [employer]    In Grade:   finished [employer]  Achieves:    3.0 GPA  Major:    music  Sports:     none    Activities:    marching band    SOCIAL DETERMINANTS OF HEALTH:    Within the past 12 months, have you worried that your food would run out before you got money to buy more?  No  Within the past 12 months, the food you bought just did not last and you did not have money to get more?  No      Current Medications[1]    Medical History[2]    Surgical History[3]    Family History[4]    Social History[5]    Objective   There were no vitals taken for this visit.  BSA: There is no height or weight on file to calculate BSA.        BMI: There is no height or weight on file to calculate BMI.   Growth percentiles: Height:  Facility age limit for growth %ольга is 20 years.   Weight:  Facility age limit for growth %ольга is 20 years.  BMI:  No height and weight on file for this encounter.    PHYSICAL EXAM  General  General Appearance - Not in acute distress, Not Irritable, Not Lethargic / Slow.  Mental Status - Alert.  Build & Nutrition - Well developed and Well nourished.  Hydration - Well hydrated.    Integumentary  - - warm and dry with no rashes, normal skin turgor and scalp and hair without rash, or lesion.    Head and Neck  - - normalocephalic, neck supple, thyroid normal size and consistancy and no lymphadenopathy.  Head    Fontanelles and Sutures: Anterior Cushman - Characteristics - closed. Posterior Cushman -  Characteristics - closed.  Neck  Global Assessment - full range of motion, non-tender, No lymphadenopathy, no nucchal rigidty, no torticollis.  Trachea - midline.    Eye  - - Bilateral - pupils equal and round (No strabismus), sclera clear and lids pink without edema or mass.  Fundi - Bilateral - Normal.    ENMT  - - Bilateral - TM pearly grey with good light reflex, external auditory canal pink and dry, nasopharynx moist and pink and oropharynx moist and pink, tonsils normal, uvula midline .  Ears  Pinna - Bilateral - no generalized tenderness observed. External Auditory Canal - Bilateral - no edema noted in EAC, no drainage observed.  Mouth and Throat  Oral Cavity/Oropharynx - Hard Palate - no asymmetry observed, no erythema noted. Soft Palate - no asymmetry noted, no erythema noted. Oral Mucosa - moist.    Chest and Lung Exam  - - Bilateral - clear to auscultation, normal breathing effort and no chest deformity.  Inspection  Movements - Normal and Symmetrical. Accessory muscles - No use of accessory muscles in breathing.    Breast  - - Bilateral - symmetry, no mass palpable, no skin change and no nipple discharge.    Cardiovascular  - - regular rate and rhythm and no murmur, rub, or thrill.    Abdomen  - - soft, nontender, normal bowel sounds and no hepatomegaly, splenomegaly, or mass.  Inspection  Inspection of the abdomen reveals - No Abnormal pulsations, No Paradoxical movements and No Hernias. Skin - Inspection of the skin of the abdomen reveals - No Stria and No Ecchymoses.  Palpation/Percussion  Palpation and Percussion of the abdomen reveal - Soft, Non Tender, No Rebound tenderness, No Rigidity (guarding), No Abnormal dullness to percussion, No Abnormal tympany to percussion, No hepatosplenomegaly, No Palpable abdominal masses and No Subcutaneous crepitus.  Auscultation  Auscultation of the abdomen reveals - Bowel sounds normal, No Abdominal bruits and No Venous hums.    Male Genitourinary  - - Bilateral -  normal circumcised phallus, testicle smooth, round, and normal size and no palpable inguinal hernia.  Evaluation of genitourinary system reveals - scrotum non-tender, no masses, normal testes, no palpable masses, urethral meatus normal, no discharge, normal penis and normal anus and perineum, no lesions.  Sexual Maturity  Will 5 - Adult hair pattern, Adult penile size and shape and Adult testicular size and shape.    Peripheral Vascular  - - Bilateral - peripheral pulses palpable in upper and lower extremity and no edema present.  Upper Extremity  Inspection - Bilateral - No Cyanotic nailbeds, No Delayed capillary refill, no Digital clubbing, No Erythema, Not Pale, No Petechiae. Palpation - Temperature - Bilateral - Normal.  Lower Extremity  Inspection - Bilateral - No Cyanotic nailbeds, No Delayed capillary refill, No Erythema, Not Pale. Palpation - Temperature - Bilateral - Normal.    Neurologic  - - normal sensation, cranial nerves II-XII intact and deep tendon reflexes normal.  Neurologic evaluation reveals  - normal sensation, normal coordination and upper and lower extremity deep tendon reflexes intact bilaterally .  Mental Status  Affect - normal. Speech - Normal. Thought content/perception - Normal. Cognitive function - Normal.  Cranial Nerves  III Oculomotor - Pupillary constriction - Bilateral - Normal. Eye Movements - Nystagmus - Bilateral - None.  Overall Assessment of Muscle Strength and Tone reveals  Upper Extremities - Right Deltoid - 5/5. Left Deltoid - 5/5. Right Bicep - 5/5. Left Bicep - 5/5. Right Tricep - 5/5. Left Tricep - 5/5. Right Intrinsics - 5/5. Left Intrinsics - 5/5. Lower Extremities - Right Iliopsoas - 5/5. Left Iliopsoas - 5/5. Right Quadriceps - 5/5. Left Quadriceps - 5/5. Right Hamstrings - 5/5. Left Hamstrings - 5/5. Right Tibialis Anterior - 5/5. Left Tibialis Anterior - 5/5. Right Gastroc-Soleus - 5/5. Left Gastroc-Soleus - 5/5.  Meningeal Signs - None.    Musculoskeletal  - -  normal posture, normal gait and station, Head and neck are symmetric, no deformities, masses or tenderness, Head and neck show normal ROM without pain or weakness, Spine shows normal curvatures full ROM without pain or weakness, Upper extremities show normal ROM without pain or weakness, Lower extremities show full ROM without pain or weakness and Patient is able to heel walk, toe walk, and duck walk.  Lower Extremity  Hip - Examination of the right hip reveals - no instability, subluxation or laxity. Examination of the left hip reveals - no instability, subluxation or laxity.    Lymphatic  - - Bilateral - no lymphadenopathy.        Assessment/Plan   Problem List Items Addressed This Visit       [facility]ism ([facility])    Body mass index (BMI) of 24.0 to 24.9 in adult    Generalized anxiety disorder    Visual acuity reduced    Well adult exam - Primary     Other Visit Diagnoses         Depression screen        Relevant Orders    Staff Communication: PHQ-9, Ask Suicide Questions      Encounter for screening for hematologic disorder        Relevant Orders    POCT hemoglobin manually resulted      Screening for blood or protein in urine        Relevant Orders    POCT UA (nonautomated) manually resulted      Hearing screen without abnormal findings        Relevant Orders    Hearing screen            Report:   Distortion product evoked otoacoustic emissions limited evaluation (to confirm the presence or absence of hearing disorder, at 2, 3, 4 and 5 kHz for each ear) were obtained.    interpretation:   Normal hearing      ANTICPIATORY GUIDANCE:  The following topics were discussed:   use of alcohol, tobacco, and drugs with discussion of health risks; acedemics with discussion of acedemic performance, extra-curricular activities, career planning; dental care and encouragment of biannual dental cleanings; fire safety; firearm safety; water safety; bullying and harassement; safe home and school environments; history of past or  current abuse; helmet safety for all at risk recreational and competetive activities; sex including use of condoms, STD testing.  car safety and use of seatbelts.  Discussed importance of maintaining physical activity.      Jacques Licea MD         [1]   Current Outpatient Medications:     escitalopram (Lexapro) 20 mg tablet, Take 1 tablet (20 mg) by mouth once daily., Disp: 90 tablet, Rfl: 1    fluticasone (Flonase) 50 mcg/actuation nasal spray, Administer 2 sprays into each nostril once daily. Shake gently. Before first use, prime pump. After use, clean tip and replace cap., Disp: 16 g, Rfl: 11  [2]   Past Medical History:  Diagnosis Date    Other conditions influencing health status 2020    Birth of     Other conditions influencing health status 2020    No prior hospitalizations   [3]   Past Surgical History:  Procedure Laterality Date    OTHER SURGICAL HISTORY  2020    Surgery   [4] No family history on file.  [5]   Social History  Tobacco Use    Smoking status: Never     Passive exposure: Never    Smokeless tobacco: Never   Vaping Use    Vaping status: Never Used

## 2025-08-01 ENCOUNTER — RESULTS FOLLOW-UP (OUTPATIENT)
Dept: PEDIATRICS | Facility: CLINIC | Age: 20
End: 2025-08-01
Payer: COMMERCIAL

## 2025-08-01 LAB
ALBUMIN SERPL-MCNC: 4.6 G/DL (ref 3.6–5.1)
ALP SERPL-CCNC: 71 U/L (ref 36–130)
ALT SERPL-CCNC: 26 U/L (ref 9–46)
ANION GAP SERPL CALCULATED.4IONS-SCNC: 12 MMOL/L (CALC) (ref 7–17)
AST SERPL-CCNC: 19 U/L (ref 10–40)
BASOPHILS # BLD AUTO: 48 CELLS/UL (ref 0–200)
BASOPHILS NFR BLD AUTO: 0.7 %
BILIRUB SERPL-MCNC: 0.6 MG/DL (ref 0.2–1.2)
BUN SERPL-MCNC: 18 MG/DL (ref 7–25)
CALCIUM SERPL-MCNC: 9.9 MG/DL (ref 8.6–10.3)
CHLORIDE SERPL-SCNC: 105 MMOL/L (ref 98–110)
CO2 SERPL-SCNC: 24 MMOL/L (ref 20–32)
CREAT SERPL-MCNC: 1.01 MG/DL (ref 0.6–1.24)
EGFRCR SERPLBLD CKD-EPI 2021: 109 ML/MIN/1.73M2
EOSINOPHIL # BLD AUTO: 61 CELLS/UL (ref 15–500)
EOSINOPHIL NFR BLD AUTO: 0.9 %
ERYTHROCYTE [DISTWIDTH] IN BLOOD BY AUTOMATED COUNT: 12.5 % (ref 11–15)
GLUCOSE SERPL-MCNC: 90 MG/DL (ref 65–99)
HCT VFR BLD AUTO: 47.5 % (ref 38.5–50)
HGB BLD-MCNC: 16 G/DL (ref 13.2–17.1)
LYMPHOCYTES # BLD AUTO: 1904 CELLS/UL (ref 850–3900)
LYMPHOCYTES NFR BLD AUTO: 28 %
MCH RBC QN AUTO: 30.1 PG (ref 27–33)
MCHC RBC AUTO-ENTMCNC: 33.7 G/DL (ref 32–36)
MCV RBC AUTO: 89.3 FL (ref 80–100)
MONOCYTES # BLD AUTO: 632 CELLS/UL (ref 200–950)
MONOCYTES NFR BLD AUTO: 9.3 %
NEUTROPHILS # BLD AUTO: 4155 CELLS/UL (ref 1500–7800)
NEUTROPHILS NFR BLD AUTO: 61.1 %
PLATELET # BLD AUTO: 179 THOUSAND/UL (ref 140–400)
PMV BLD REES-ECKER: 12.4 FL (ref 7.5–12.5)
POTASSIUM SERPL-SCNC: 4.1 MMOL/L (ref 3.5–5.3)
PROT SERPL-MCNC: 7 G/DL (ref 6.1–8.1)
RBC # BLD AUTO: 5.32 MILLION/UL (ref 4.2–5.8)
SODIUM SERPL-SCNC: 141 MMOL/L (ref 135–146)
TSH SERPL-ACNC: 1.4 MIU/L (ref 0.4–4.5)
WBC # BLD AUTO: 6.8 THOUSAND/UL (ref 3.8–10.8)

## 2025-08-01 NOTE — TELEPHONE ENCOUNTER
----- Message from Jacques Licea sent at 8/1/2025  8:40 AM EDT -----  Let PATIENT know blood counts, kidney function, liver function, thyroid function were all normal    ----- Message -----  From: Sugar Bustillos MA  Sent: 6/11/2025  11:09 AM EDT  To: Jacques Licea MD

## 2025-12-11 ENCOUNTER — APPOINTMENT (OUTPATIENT)
Dept: PEDIATRICS | Facility: CLINIC | Age: 20
End: 2025-12-11
Payer: COMMERCIAL

## 2026-06-17 ENCOUNTER — APPOINTMENT (OUTPATIENT)
Dept: PEDIATRICS | Facility: CLINIC | Age: 21
End: 2026-06-17
Payer: COMMERCIAL